# Patient Record
(demographics unavailable — no encounter records)

---

## 2024-11-05 NOTE — ADDENDUM
[FreeTextEntry1] : I, Ramo Reeves, acted solely as a scribe for Dr. Jazmín Doss D.O. on this date 11/05/2024.   All medical record entries made by the Scribe were at my, Dr. Jazmín Doss D.O., direction and personally dictated by me on 11/05/2024. I have reviewed the chart and agree that the record accurately reflects my personal performance of the history, physical exam, assessment and plan. I have also personally directed, reviewed, and agreed with the chart.

## 2024-11-05 NOTE — CONSULT LETTER
[Dear  ___] : Dear  [unfilled], [Courtesy Letter:] : I had the pleasure of seeing your patient, [unfilled], in my office today. [Please see my note below.] : Please see my note below. [Consult Closing:] : Thank you very much for allowing me to participate in the care of this patient.  If you have any questions, please do not hesitate to contact me. [Sincerely,] : Sincerely, [FreeTextEntry3] : Dr. Jazmín Doss

## 2024-11-05 NOTE — HISTORY OF PRESENT ILLNESS
[TextBox_4] : DEV ROB is a 60 year old female, with history of COPD, lung nodules and insomnia, who presents to the office for follow up evaluation. DEV is overall feeling well at this time though she complains of a mild URTI. Dev denies any known allergies to antibiotics

## 2024-11-05 NOTE — PROCEDURE
[FreeTextEntry1] : Pulmonary Function Test performed in my office today: Spirometry: Within normal limits; Lung Volume: Within normal limits; Diffusion: Moderate impairment.  Flu shot administered to DEV in office today, at 10:06 AM Nov 05, 2024 _______ Chest CT scan performed on July 8, 2024: Stable mild parabronchial thickening and mild emphysema.  Few tiny nodules are stable and benign.  There is no new or suspicious lung nodule.

## 2024-11-05 NOTE — ASSESSMENT
[FreeTextEntry1] : Increase Lunesta 3 mg p.o. nightly as needed for sleep aid for insomnia. Continue Symbicort HFA for history of COPD. Repeat chest CT scan for follow-up on history of lung nodule in 7 months.  Dr. Doss discussed the importance of and offered the flu shot to DEV in office today. DEV  accepted.  Dev should begin using amoxycylin,1 pill once a day for 7 days and prednisone taper; 30mg once daily for three days, then 20mg once daily for three days, then 10mg once daily for three days for sinusitis.  Return for sleep follow up in 3 months.

## 2025-02-05 NOTE — HISTORY OF PRESENT ILLNESS
[TextBox_4] : LARRY/vivek   Has been having shortness of breath since November; was seen in urgent care twice, where the first time she was given steroids and albuterol via nebulizer and then was seen again at the end of December, where she was sent to the ER, and was given steroids and antibiotics, and nebulizer treatments; now has been intermittent   Shortness of breath has resolved; states she has had "some pain" in the left upper back, which was also present when she initially began having shortness of breath; does not know if its due to an ablation that was done   Insurance no longer covering Symbicort; was given 3-months sample per another physician.   She continues to take Lunesta for sleep; notes that it helps her to fall asleep, but still having trouble staying asleep.

## 2025-02-05 NOTE — ASSESSMENT
[FreeTextEntry1] : Obtained and reviewed pulmonary function test and NiOx results with patient today. PFT was stable compared to baseline and NiOx was unremarkable.   Dr. Doss's recommendation: Rufina should continue Symbicort until her current supply runs out, then she will switch to Wixela 100-50 mcg/act, one puff BID, for COPD maintenance. Rufina should continue Eszopiclone for insomnia. Return for follow up in 3 months.  Repeat chest CT scan for follow-up on history of lung nodules in 5 months.

## 2025-02-05 NOTE — SIGNATURES
[TextEntry] : This note was written by Ruddy Caba on 02/04/2025 acting as medical scribe for Dr. Jazmín Doss. I, Dr. Jazmín Doss, have read and attest that all the information, medical decision making and discharge instructions within are true and accurate.

## 2025-02-05 NOTE — PROCEDURE
[FreeTextEntry1] : Pulmonary Function Test performed today, 02/04/2025, in my office: Spirometry: Within normal limits. Lung Volume: Within normal limits. Diffusion: Moderate impairment. _____________________________________________________________ Exhaled Nitric Oxide             Final  No Documents Attached    	Test  	Result  	Flag	Reference	Goal	Last Verified  	Exhaled Nitric Oxide	13	 	 		REQUIRED  Ordered by: PARTH ADKINS       Collected/Examined: 04Feb2025 11:09AM       Verification Required       Stage: Final       Performed at: In Office       Performed by: DIONNA CRUZ       Resulted: 04Feb2025 11:09AM       Last Updated: 04Feb2025 11:10AM       _____________________________________________________________  Xray Chest 2 Views PA/Lat             Final  No Documents Attached    	 Chest x-ray PA and lateral views performed in my office today showed clear lungs, no evidence of infiltrates or pleural effusions.  Ordered by: DIONNA CRUZ       Collected/Examined: 04Feb2025 12:23PM       Verified by: PARTH ADKINS 04Feb2025 02:21PM       Result Communication: No patient communication needed at this time; Stage: Final       Performed at: In Office       Performed by: PARTH ADKINS       Resulted: 04Feb2025 12:23PM       Last Updated: 04Feb2025 02:21PM       Accession: [id number]

## 2025-05-06 NOTE — ASSESSMENT
[FreeTextEntry1] : Obtained and reviewed pulmonary function test and NiOx results with patient today. PFT was stable compared to baseline and NiOx was unremarkable.   Dr. Doss's recommendation: Rufina should continu Wixela 100-50 mcg/act, one puff BID, for COPD maintenance. Rufina should start Silenor 3mg for insomnia. Return for follow up in 1 months for f/u. Start Singulair for seasonal allergies.  Repeat chest CT scan for follow-up on history of lung nodules in 2 months.

## 2025-05-06 NOTE — PROCEDURE
[FreeTextEntry1] : Pulmonary Function Test performed today, in my office: Spirometry: Within normal limits. Lung Volume: Within normal limits. Diffusion: Moderate impairment. _____________________________________________________________  Exhaled Nitric Oxide             Final  No Documents Attached    	Test  	Result  	Flag	Reference	Goal	Last Verified  	Exhaled Nitric Oxide	22	 	 		REQUIRED  Ordered by: DIONNA CRUZ       Collected/Examined: [id number] 09:48AM       Verification Required       Stage: Final       Performed at: In Office       Performed by: DIONNA CRUZ       Resulted: [id number] 09:48AM       Last Updated: [id number] 09:49AM

## 2025-05-06 NOTE — HISTORY OF PRESENT ILLNESS
[TextBox_4] : LARRY/vivek   Has been having shortness of breath since November; was seen in urgent care twice, where the first time she was given steroids and albuterol via nebulizer and then was seen again at the end of December, where she was sent to the ER, and was given steroids and antibiotics, and nebulizer treatments; now has been intermittent   Shortness of breath has resolved; states she has had "some pain" in the left upper back, which was also present when she initially began having shortness of breath; does not know if its due to an ablation that was done   Insurance no longer covering Symbicort; was given 3-months sample per another physician.   She complains of insomnia, not helped with Lunesta now.

## 2025-06-10 NOTE — HISTORY OF PRESENT ILLNESS
[TextBox_4] : DEV is a 61 year female, with history of COPD, lung nodules, GERD, hypersomnolence, who presents to the office today for a follow up pulmonary evaluation. DEV c/o PND, sore throat, and back pain when she breaths in; she states that she feels like she is getting sick. Otherwise, DEV reports to be feeling reasonably well at this time with no other respiratory complaints. DEV also states that she is tolerating and benefiting in use of Doxepin for her insomnia.

## 2025-06-10 NOTE — ASSESSMENT
[FreeTextEntry1] : POCT Rapid Flu and COVID-19 and strep performed in my office today returned with negative results.  Collected nasopharyngeal flu panel/COVID/RSV today for more definitive assessment. Dr. Doss will contact DEV to discuss the results.   Dr. Doss's recommendation: At this point, DEV should start taking Z-alex and Prednisone taper for her cough/inflammation/COPD exacerbation. Dev should continue Wixela 100-50 mcg/act, one puff BID, for COPD maintenance. Dev should continue Doxepin 3 mg, 1 tablet p.o. daily, for insomnia. DEV should return for a pulmonary follow up visit in 1 month.

## 2025-06-10 NOTE — PHYSICAL EXAM
[No Acute Distress] : no acute distress [Normal Oropharynx] : normal oropharynx [Normal Appearance] : normal appearance [No Neck Mass] : no neck mass [Normal Rate/Rhythm] : normal rate/rhythm [Normal S1, S2] : normal s1, s2 [No Murmurs] : no murmurs [Clear to Auscultation Bilaterally] : clear to auscultation bilaterally [No Resp Distress] : no resp distress [No Abnormalities] : no abnormalities [Benign] : benign [Normal Gait] : normal gait [No Clubbing] : no clubbing [No Cyanosis] : no cyanosis [No Edema] : no edema [FROM] : FROM [Normal Color/ Pigmentation] : normal color/ pigmentation [No Focal Deficits] : no focal deficits [Normal Affect] : normal affect [Oriented x3] : oriented x3

## 2025-06-10 NOTE — REVIEW OF SYSTEMS
[Negative] : Endocrine [Sore Throat] : sore throat [Postnasal Drip] : postnasal drip [Cough] : cough [Chest Tightness] : chest tightness [TextBox_94] : left upper back [TextBox_30] : pain during inhalation [TextBox_151] : insomnia

## 2025-06-10 NOTE — REVIEW OF SYSTEMS
[Negative] : Endocrine [Sore Throat] : sore throat [Postnasal Drip] : postnasal drip [Cough] : cough [Chest Tightness] : chest tightness [TextBox_30] : pain during inhalation [TextBox_94] : left upper back [TextBox_151] : insomnia

## 2025-06-10 NOTE — PHYSICAL EXAM
[No Acute Distress] : no acute distress [Normal Oropharynx] : normal oropharynx [Normal Appearance] : normal appearance [No Neck Mass] : no neck mass [Normal Rate/Rhythm] : normal rate/rhythm [Normal S1, S2] : normal s1, s2 [No Murmurs] : no murmurs [Clear to Auscultation Bilaterally] : clear to auscultation bilaterally [No Resp Distress] : no resp distress [No Abnormalities] : no abnormalities [Benign] : benign [Normal Gait] : normal gait [No Clubbing] : no clubbing [No Cyanosis] : no cyanosis [No Edema] : no edema [Normal Color/ Pigmentation] : normal color/ pigmentation [FROM] : FROM [No Focal Deficits] : no focal deficits [Oriented x3] : oriented x3 [Normal Affect] : normal affect

## 2025-06-10 NOTE — SIGNATURES
[TextEntry] : This note was written by Karan Persaud on 06/10/2025 acting as medical scribe for Dr. Jazmín Doss. I, Dr. Jazmín Doss, have read and attest that all the information, medical decision making and discharge instructions within are true and accurate.

## 2025-07-16 NOTE — SIGNATURES
[TextEntry] :   This note was written by Jacquelin Morgan on  07/15/2025 acting as medical scribe for Dr. Jazmín Doss.

## 2025-07-16 NOTE — ASSESSMENT
[FreeTextEntry1] :  Dr. Doss's recommendation: At this point, DEV should continue Wixela 100-50 mcg/act, one puff BID, for COPD maintenance. Dev should continue Doxepin 3 mg, 1 tablet p.o. daily, for insomnia. DEV should continue nebulized albuterol as needed. should have a renewed Chest CT scan this month for lung cancer screening DEV should return for a pulmonary follow up visit in 1 month.

## 2025-07-16 NOTE — REVIEW OF SYSTEMS
[Sore Throat] : sore throat [Postnasal Drip] : postnasal drip [Cough] : cough [Chest Tightness] : chest tightness [Negative] : Endocrine [TextBox_30] : pain during inhalation [TextBox_94] : left upper back [TextBox_151] : insomnia

## 2025-07-16 NOTE — HISTORY OF PRESENT ILLNESS
[Former] : former [TextBox_4] : DEV is a 61 year female, with history of COPD, lung nodules, GERD, hypersomnolence, who presents to the office today for a follow up pulmonary evaluation. DEV c/o PND, sore throat, and back pain when she breaths in; she states that she feels like she is getting sick. Otherwise, DEV reports to be feeling reasonably well at this time with no other respiratory complaints. DEV also states that she is tolerating and benefiting in use of Doxepin for her insomnia. Dev has been consistent with Wixela. She is wanting nebulized albuterol, Dev notes minimal wheezing this morning. She notes her PCP has retired, she is looking for a new one. DEV notes she had the urge to smoke a cigarette this morning.